# Patient Record
Sex: MALE
[De-identification: names, ages, dates, MRNs, and addresses within clinical notes are randomized per-mention and may not be internally consistent; named-entity substitution may affect disease eponyms.]

---

## 2021-01-15 ENCOUNTER — NON-APPOINTMENT (OUTPATIENT)
Age: 46
End: 2021-01-15

## 2021-02-10 ENCOUNTER — NON-APPOINTMENT (OUTPATIENT)
Age: 46
End: 2021-02-10

## 2021-02-10 ENCOUNTER — APPOINTMENT (OUTPATIENT)
Dept: OTOLARYNGOLOGY | Facility: CLINIC | Age: 46
End: 2021-02-10
Payer: COMMERCIAL

## 2021-02-10 VITALS
SYSTOLIC BLOOD PRESSURE: 137 MMHG | HEIGHT: 72 IN | DIASTOLIC BLOOD PRESSURE: 88 MMHG | HEART RATE: 64 BPM | WEIGHT: 165 LBS | TEMPERATURE: 98 F | BODY MASS INDEX: 22.35 KG/M2

## 2021-02-10 DIAGNOSIS — H92.02 OTALGIA, LEFT EAR: ICD-10-CM

## 2021-02-10 DIAGNOSIS — H61.23 IMPACTED CERUMEN, BILATERAL: ICD-10-CM

## 2021-02-10 PROBLEM — Z00.00 ENCOUNTER FOR PREVENTIVE HEALTH EXAMINATION: Status: ACTIVE | Noted: 2021-02-10

## 2021-02-10 PROCEDURE — 99203 OFFICE O/P NEW LOW 30 MIN: CPT | Mod: 25

## 2021-02-10 PROCEDURE — 99072 ADDL SUPL MATRL&STAF TM PHE: CPT

## 2021-02-10 PROCEDURE — 69210 REMOVE IMPACTED EAR WAX UNI: CPT

## 2021-02-10 NOTE — ASSESSMENT
[FreeTextEntry1] : Patient is a 46 year old male who presents with left sided otalgia. Physical examination notable for impacted cerumen bilaterally which was removed using suction and curette.\par \par Plan\par - f/u annually \par - symptoms resolved after removal

## 2021-02-10 NOTE — HISTORY OF PRESENT ILLNESS
[de-identified] : Patient is a 46 year old male who presents with a 3 month history of left sided otalgia. He denies otorrhea, otorhaggia, change in hearing, tinnitus, and dizziness.

## 2021-02-10 NOTE — END OF VISIT
[FreeTextEntry3] : I personally saw and examined TANGELA SAHU in detail.  I spoke to ANASTACIA Swartz regarding the assessment and plan of care. I performed the procedures and relevant physical exam.  I have reviewed the above assessment and plan of care and I agree.  I have made changes to the body of the note wherever necessary and appropriate.

## 2021-02-10 NOTE — PROCEDURE
[FreeTextEntry3] : Procedure - Cerumen Removal. \par After informed verbal consent is obtained, cerumen is removed from the b/l ear canal with a curette and suction.  Normal appearing canal following removal.\par peroxide soak used LEFT

## 2021-02-10 NOTE — CONSULT LETTER
[Dear  ___] : Dear  [unfilled], [Consult Letter:] : I had the pleasure of evaluating your patient, [unfilled]. [Please see my note below.] : Please see my note below. [Consult Closing:] : Thank you very much for allowing me to participate in the care of this patient.  If you have any questions, please do not hesitate to contact me. [Sincerely,] : Sincerely, [FreeTextEntry3] : Sincerely, \par \par Magalys Aden MD\par Otolaryngology- Facial Plastics \par [address]\[place] [address]\par (P) - [phone]\par (F) - [phone]\par \par

## 2021-02-10 NOTE — PHYSICAL EXAM
[Midline] : trachea located in midline position [Normal] : no rashes [de-identified] : +impacted cerumen b/l

## 2021-04-05 ENCOUNTER — APPOINTMENT (OUTPATIENT)
Dept: DISASTER EMERGENCY | Facility: OTHER | Age: 46
End: 2021-04-05

## 2022-04-08 ENCOUNTER — APPOINTMENT (OUTPATIENT)
Dept: OPHTHALMOLOGY | Facility: CLINIC | Age: 47
End: 2022-04-08

## 2022-07-21 PROBLEM — Z00.00 ENCOUNTER FOR PREVENTIVE HEALTH EXAMINATION: Status: ACTIVE | Noted: 2022-07-21

## 2022-07-23 DIAGNOSIS — M25.562 PAIN IN LEFT KNEE: ICD-10-CM

## 2022-07-25 ENCOUNTER — APPOINTMENT (OUTPATIENT)
Dept: ORTHOPEDIC SURGERY | Facility: CLINIC | Age: 47
End: 2022-07-25

## 2022-07-25 ENCOUNTER — NON-APPOINTMENT (OUTPATIENT)
Age: 47
End: 2022-07-25

## 2022-07-25 VITALS — WEIGHT: 165 LBS | HEIGHT: 72 IN | BODY MASS INDEX: 22.35 KG/M2

## 2022-07-25 DIAGNOSIS — S83.282A OTHER TEAR OF LATERAL MENISCUS, CURRENT INJURY, LEFT KNEE, INITIAL ENCOUNTER: ICD-10-CM

## 2022-07-25 PROCEDURE — 73564 X-RAY EXAM KNEE 4 OR MORE: CPT | Mod: LT

## 2022-07-25 PROCEDURE — 99204 OFFICE O/P NEW MOD 45 MIN: CPT

## 2023-08-11 ENCOUNTER — APPOINTMENT (OUTPATIENT)
Dept: OPHTHALMOLOGY | Facility: CLINIC | Age: 48
End: 2023-08-11
Payer: COMMERCIAL

## 2023-08-11 ENCOUNTER — NON-APPOINTMENT (OUTPATIENT)
Age: 48
End: 2023-08-11

## 2023-08-11 PROCEDURE — 92004 COMPRE OPH EXAM NEW PT 1/>: CPT

## 2024-09-26 ENCOUNTER — APPOINTMENT (OUTPATIENT)
Dept: ORTHOPEDIC SURGERY | Facility: CLINIC | Age: 49
End: 2024-09-26
Payer: COMMERCIAL

## 2024-09-26 VITALS — HEIGHT: 72 IN | BODY MASS INDEX: 22.35 KG/M2 | WEIGHT: 165 LBS

## 2024-09-26 DIAGNOSIS — S83.272D COMPLEX TEAR OF LATERAL MENISCUS, CURRENT INJURY, LEFT KNEE, SUBSEQUENT ENCOUNTER: ICD-10-CM

## 2024-09-26 DIAGNOSIS — S83.232D COMPLEX TEAR OF MEDIAL MENISCUS, CURRENT INJURY, LEFT KNEE, SUBSEQUENT ENCOUNTER: ICD-10-CM

## 2024-09-26 PROCEDURE — 99214 OFFICE O/P EST MOD 30 MIN: CPT

## 2024-09-27 NOTE — PHYSICAL EXAM
[de-identified] : Left knee exam  Skin: Clean, dry, intact Inspection: No obvious malalignment, no masses, no swelling, + trace effusion Pulses: 2+ DP/PT pulses ROM: 0-135 degrees of flexion. + pain with deep knee flexion Tenderness: + mild MJLT. No LJLT. No pain over the patella facets. No pain to the quadriceps tendon. No pain to the patella tendon. No posterior knee tenderness. Stability: Stable to varus, valgus. Negative lachman testing. Negative anterior drawer, negative posterior drawer. Strength: 5/5 Q/H/TA/GS/EHL, without atrophy Neuro: In tact to light touch throughout, DTR's normal Additional tests: +McMurrays test, Negative patellar grind test.  [de-identified] : Outside Images below were independently interpreted by Dr. Ramos:   2 views of the left knee were obtained, 10/10/2023, that show no acute fracture or dislocation. There is no medial, no lateral and mild patellofemoral degenerative changes seen. There is no significant malalignment. No significant other obvious osseous abnormality, otherwise unremarkable.   MRI left knee dated 10/16/2023 shows tears of medial and lateral meniscus. Grade IV chondromalacia patella.   We independently reviewed and discussed in detail the images and the radiologic reports with the patient.

## 2024-09-27 NOTE — HISTORY OF PRESENT ILLNESS
[de-identified] : 49-year-old male presents with a 7-year history of left knee pain, which initially started after squatting. He was evaluated by an orthopedist in the past, but the specific diagnosis is unclear. The pain has been intermittent, worsening notably in 2023, leading to an MRI and a recommendation for surgery from Dr. Read; however, the patient opted against surgery as his pain improved at that time. Since August 2024, he has experienced a significant increase in pain, prompting a visit to an outside orthopedist who referred him to physical therapy and prescribed NSAIDs. The patient discontinued physical therapy due to exacerbation of symptoms with exercises. Currently, the pain is constant and is aggravated by activities such as getting out of a chair, squatting, kneeling, and ascending stairs. He is not taking any pain medication at this time.  The patient's past medical history, past surgical history, medications and allergies were reviewed by me today with the patient and documented accordingly. In addition, the patient's family and social history, which were noncontributory to this visit were reviewed also.

## 2024-09-27 NOTE — PHYSICAL EXAM
[de-identified] : Left knee exam  Skin: Clean, dry, intact Inspection: No obvious malalignment, no masses, no swelling, + trace effusion Pulses: 2+ DP/PT pulses ROM: 0-135 degrees of flexion. + pain with deep knee flexion Tenderness: + mild MJLT. No LJLT. No pain over the patella facets. No pain to the quadriceps tendon. No pain to the patella tendon. No posterior knee tenderness. Stability: Stable to varus, valgus. Negative lachman testing. Negative anterior drawer, negative posterior drawer. Strength: 5/5 Q/H/TA/GS/EHL, without atrophy Neuro: In tact to light touch throughout, DTR's normal Additional tests: +McMurrays test, Negative patellar grind test.  [de-identified] : Outside Images below were independently interpreted by Dr. Ramos:   2 views of the left knee were obtained, 10/10/2023, that show no acute fracture or dislocation. There is no medial, no lateral and mild patellofemoral degenerative changes seen. There is no significant malalignment. No significant other obvious osseous abnormality, otherwise unremarkable.   MRI left knee dated 10/16/2023 shows tears of medial and lateral meniscus. Grade IV chondromalacia patella.   We independently reviewed and discussed in detail the images and the radiologic reports with the patient.

## 2024-09-27 NOTE — ADDENDUM
[FreeTextEntry1] : This note was written by Swati Sena on 09/26/2024 acting solely as a scribe for Dr. Mandeep Ramos.   All medical record entries made by the Scribe were at my, Dr. Mandeep Ramos, direction and personally dictated by me on 09/26/2024. I have personally reviewed the chart and agree that the record accurately reflects my personal performance of the history, physical exam, assessment and plan.

## 2024-09-27 NOTE — DISCUSSION/SUMMARY
[de-identified] : 50 y/o male with left knee MMT/LMT  Patient presents for evaluation of his left knee pain.  The patient was previously recommended surgical meniscus debridement in 2023, but at the time symptoms improved.  Now he presents with continued pain to the medial and lateral joint line.  MRI left knee dated 10/16/2023 shows tear of medial and lateral meniscus as well as chondromalacia patella.   The patient's symptoms are consistent with his known meniscal pathology through the medial and lateral compartment of the knee with positive provocative meniscal testing as well as joint line tenderness. Treatment options that may include conservative management (e.g. RICE, activity modification, PT, injection therapy) versus operative arthroscopy. Discussed that treatment would likely depend on the nature of the injury as seen on MRI imaging. I discussed the need for updated MRI imaging in order to delineate next steps of treatment As majority of symptoms do appear to be medial on today's examination.  Recommendation: Rest, ice, compression, elevation (RICE) and OTC NSAID's as instructed until MRI imaging.   Follow up after MRI.

## 2024-09-27 NOTE — HISTORY OF PRESENT ILLNESS
[de-identified] : 49-year-old male presents with a 7-year history of left knee pain, which initially started after squatting. He was evaluated by an orthopedist in the past, but the specific diagnosis is unclear. The pain has been intermittent, worsening notably in 2023, leading to an MRI and a recommendation for surgery from Dr. Read; however, the patient opted against surgery as his pain improved at that time. Since August 2024, he has experienced a significant increase in pain, prompting a visit to an outside orthopedist who referred him to physical therapy and prescribed NSAIDs. The patient discontinued physical therapy due to exacerbation of symptoms with exercises. Currently, the pain is constant and is aggravated by activities such as getting out of a chair, squatting, kneeling, and ascending stairs. He is not taking any pain medication at this time.  The patient's past medical history, past surgical history, medications and allergies were reviewed by me today with the patient and documented accordingly. In addition, the patient's family and social history, which were noncontributory to this visit were reviewed also.